# Patient Record
Sex: MALE | Race: OTHER | URBAN - METROPOLITAN AREA
[De-identification: names, ages, dates, MRNs, and addresses within clinical notes are randomized per-mention and may not be internally consistent; named-entity substitution may affect disease eponyms.]

---

## 2022-02-04 ENCOUNTER — NEW PATIENT (OUTPATIENT)
Dept: URBAN - METROPOLITAN AREA CLINIC 14 | Facility: CLINIC | Age: 67
End: 2022-02-04

## 2022-02-04 DIAGNOSIS — D23.121: ICD-10-CM

## 2022-02-04 PROCEDURE — 99202 OFFICE O/P NEW SF 15 MIN: CPT

## 2022-02-04 ASSESSMENT — VISUAL ACUITY
OS_SC: 20/25
OD_SC: 20/25

## 2022-02-04 NOTE — PATIENT DISCUSSION
Patient presents w/ a clogged oil gland centrally.  Discussed the r/b of excision vs. flicking the lesion open.  Patient does not want to proceed w/ excision and wishes to have the lesion flicked open.  The lesion was opened via a sterile 27 guage needle.  Patient tolerated the procedure well and was given post-operative instructions to use warm compresses daily.  Follow up for any recurrence.